# Patient Record
Sex: FEMALE | Race: BLACK OR AFRICAN AMERICAN | NOT HISPANIC OR LATINO
[De-identification: names, ages, dates, MRNs, and addresses within clinical notes are randomized per-mention and may not be internally consistent; named-entity substitution may affect disease eponyms.]

---

## 2017-10-30 PROBLEM — Z00.00 ENCOUNTER FOR PREVENTIVE HEALTH EXAMINATION: Status: ACTIVE | Noted: 2017-10-30

## 2024-07-25 ENCOUNTER — APPOINTMENT (OUTPATIENT)
Dept: ORTHOPEDIC SURGERY | Facility: CLINIC | Age: 49
End: 2024-07-25
Payer: COMMERCIAL

## 2024-07-25 DIAGNOSIS — M72.2 PLANTAR FASCIAL FIBROMATOSIS: ICD-10-CM

## 2024-07-25 DIAGNOSIS — M77.8 OTHER ENTHESOPATHIES, NOT ELSEWHERE CLASSIFIED: ICD-10-CM

## 2024-07-25 DIAGNOSIS — M25.571 PAIN IN RIGHT ANKLE AND JOINTS OF RIGHT FOOT: ICD-10-CM

## 2024-07-25 DIAGNOSIS — M62.89 OTHER SPECIFIED DISORDERS OF MUSCLE: ICD-10-CM

## 2024-07-25 DIAGNOSIS — M21.41 FLAT FOOT [PES PLANUS] (ACQUIRED), RIGHT FOOT: ICD-10-CM

## 2024-07-25 DIAGNOSIS — M25.572 PAIN IN LEFT ANKLE AND JOINTS OF LEFT FOOT: ICD-10-CM

## 2024-07-25 DIAGNOSIS — M21.42 FLAT FOOT [PES PLANUS] (ACQUIRED), RIGHT FOOT: ICD-10-CM

## 2024-07-25 PROCEDURE — 99203 OFFICE O/P NEW LOW 30 MIN: CPT

## 2024-07-25 PROCEDURE — 73610 X-RAY EXAM OF ANKLE: CPT | Mod: RT

## 2024-07-26 PROBLEM — M21.41 ACQUIRED PES PLANUS OF BOTH FEET: Status: ACTIVE | Noted: 2024-07-26

## 2024-07-26 PROBLEM — M77.8 ENTHESOPATHY OF FOOT: Status: ACTIVE | Noted: 2024-07-26

## 2024-07-26 PROBLEM — M62.89 TIGHTNESS OF BOTH GASTROCNEMIUS MUSCLES: Status: ACTIVE | Noted: 2024-07-26

## 2024-07-26 NOTE — DISCUSSION/SUMMARY
[de-identified] : Discussed with patient nature of condition, potential course / sequelae, options reviewed.  NB shoe wear, gel heel cup, activity modification, physical therapy / rehabilitation and associated modalities, calf stretching exercises and HEP.  Return to office in 6 - 8 weeks / PRN, all questions answered.

## 2024-07-26 NOTE — PHYSICAL EXAM
[de-identified] : Extremity: +equinus (releases) B LE, +PPAV (supple) B feet, able to perform B SLHR testing, mild tenderness plantar fascia origin B hindfoot (L > R).  Nontender B ankles, peroneals, syndesmosis, Achilles, hindfoot ST, midfoot LF and PTT insertional, and forefoot.  Calves soft and nontender, sensorimotor unchanged, skin intact B LE.  AOx3, mood / affect normal. [de-identified] : Radiographs (3v B ankles) reveal tiny posterior and plantar calcaneal enthesophyte L hindfoot, tiny posterior calcaneal enthesophyte R hindfoot, PTS B feet.

## 2024-07-26 NOTE — PHYSICAL EXAM
[de-identified] : Extremity: +equinus (releases) B LE, +PPAV (supple) B feet, able to perform B SLHR testing, mild tenderness plantar fascia origin B hindfoot (L > R).  Nontender B ankles, peroneals, syndesmosis, Achilles, hindfoot ST, midfoot LF and PTT insertional, and forefoot.  Calves soft and nontender, sensorimotor unchanged, skin intact B LE.  AOx3, mood / affect normal. [de-identified] : Radiographs (3v B ankles) reveal tiny posterior and plantar calcaneal enthesophyte L hindfoot, tiny posterior calcaneal enthesophyte R hindfoot, PTS B feet.

## 2024-07-26 NOTE — DISCUSSION/SUMMARY
[de-identified] : Discussed with patient nature of condition, potential course / sequelae, options reviewed.  NB shoe wear, gel heel cup, activity modification, physical therapy / rehabilitation and associated modalities, calf stretching exercises and HEP.  Return to office in 6 - 8 weeks / PRN, all questions answered.

## 2024-07-26 NOTE — HISTORY OF PRESENT ILLNESS
[FreeTextEntry1] : Ms. NIMISHA SCHMIDT  is a 49 year old female who presents with 5 months of right heel and hindfoot pain and 6 weeks of left heel and hindfoot pain.  She feels electric shocks when she is laying down at night and in the morning.  She feels there is some swelling.  In the morning, it is painful to walk.  She will use a cream for symptom control.

## 2024-12-04 ENCOUNTER — APPOINTMENT (OUTPATIENT)
Dept: ORTHOPEDIC SURGERY | Facility: CLINIC | Age: 49
End: 2024-12-04